# Patient Record
Sex: MALE | Race: WHITE | Employment: FULL TIME | URBAN - METROPOLITAN AREA
[De-identification: names, ages, dates, MRNs, and addresses within clinical notes are randomized per-mention and may not be internally consistent; named-entity substitution may affect disease eponyms.]

---

## 2021-10-23 ENCOUNTER — HOSPITAL ENCOUNTER (OUTPATIENT)
Age: 66
Discharge: HOME | End: 2021-10-23
Attending: EMERGENCY MEDICINE
Payer: COMMERCIAL

## 2021-10-23 VITALS
TEMPERATURE: 98 F | RESPIRATION RATE: 16 BRPM | HEART RATE: 63 BPM | SYSTOLIC BLOOD PRESSURE: 117 MMHG | OXYGEN SATURATION: 99 % | DIASTOLIC BLOOD PRESSURE: 79 MMHG

## 2021-10-23 DIAGNOSIS — U07.1 COVID-19: Primary | ICD-10-CM

## 2021-10-23 PROCEDURE — 99204 OFFICE O/P NEW MOD 45 MIN: CPT

## 2021-10-23 NOTE — ED QUICK NOTES
INFUSION COMPLETED. NO SIGNS OF AN ADVERSE REACTION. IV LINE FLUSHED WITH 50CC'S OF NORMAL SALINE. VITAL SIGNS STABLE. WILL CONTINUE WITH MONITORING.

## 2021-10-23 NOTE — ED PROVIDER NOTES
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   There is no posterior pharyngeal erythema  Chest: Clear to auscultation, no tenderness  Cardiovascular: Regular rate and rhythm without murmur  Abdomen: Soft, nontender follow-up provider specified. Medications Prescribed:  There are no discharge medications for this patient.

## 2021-10-23 NOTE — ED INITIAL ASSESSMENT (HPI)
PATIENT ARRIVED AMBULATORY TO ROOM. PATIENT STATES HE TESTED POSITIVE VIA A SPIT TEST 5 DAYS AGO. 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 STATES HE IS NOT VACCINATED DUE TO AN ANAPHYLACTIC REACTION TO VACCINES.  PATIENT STATES HE WAS SYMPTOMATIC FOR COVID